# Patient Record
Sex: FEMALE | Race: BLACK OR AFRICAN AMERICAN | NOT HISPANIC OR LATINO
[De-identification: names, ages, dates, MRNs, and addresses within clinical notes are randomized per-mention and may not be internally consistent; named-entity substitution may affect disease eponyms.]

---

## 2019-05-03 PROBLEM — Z00.00 ENCOUNTER FOR PREVENTIVE HEALTH EXAMINATION: Status: ACTIVE | Noted: 2019-05-03

## 2019-05-13 ENCOUNTER — APPOINTMENT (OUTPATIENT)
Dept: OTOLARYNGOLOGY | Facility: CLINIC | Age: 44
End: 2019-05-13
Payer: COMMERCIAL

## 2019-05-13 VITALS
HEART RATE: 98 BPM | BODY MASS INDEX: 31.98 KG/M2 | HEIGHT: 66 IN | DIASTOLIC BLOOD PRESSURE: 88 MMHG | SYSTOLIC BLOOD PRESSURE: 148 MMHG | WEIGHT: 199 LBS

## 2019-05-13 PROCEDURE — 31575 DIAGNOSTIC LARYNGOSCOPY: CPT

## 2019-05-13 PROCEDURE — 99204 OFFICE O/P NEW MOD 45 MIN: CPT | Mod: 25

## 2019-05-14 RX ORDER — METOPROLOL TARTRATE 25 MG/1
25 TABLET, FILM COATED ORAL
Refills: 0 | Status: ACTIVE | COMMUNITY

## 2019-05-14 RX ORDER — AMLODIPINE BESYLATE 5 MG/1
5 TABLET ORAL
Refills: 0 | Status: ACTIVE | COMMUNITY

## 2019-08-16 ENCOUNTER — APPOINTMENT (OUTPATIENT)
Dept: OTOLARYNGOLOGY | Facility: HOSPITAL | Age: 44
End: 2019-08-16

## 2021-07-19 ENCOUNTER — APPOINTMENT (OUTPATIENT)
Dept: OTOLARYNGOLOGY | Facility: CLINIC | Age: 46
End: 2021-07-19
Payer: COMMERCIAL

## 2021-07-19 DIAGNOSIS — D44.0 NEOPLASM OF UNCERTAIN BEHAVIOR OF THYROID GLAND: ICD-10-CM

## 2021-07-19 PROCEDURE — 31575 DIAGNOSTIC LARYNGOSCOPY: CPT

## 2021-07-19 PROCEDURE — 99214 OFFICE O/P EST MOD 30 MIN: CPT | Mod: 25

## 2021-07-20 ENCOUNTER — OUTPATIENT (OUTPATIENT)
Dept: OUTPATIENT SERVICES | Facility: HOSPITAL | Age: 46
LOS: 1 days | End: 2021-07-20

## 2021-07-20 ENCOUNTER — RESULT REVIEW (OUTPATIENT)
Age: 46
End: 2021-07-20

## 2021-07-20 ENCOUNTER — APPOINTMENT (OUTPATIENT)
Dept: CT IMAGING | Facility: CLINIC | Age: 46
End: 2021-07-20
Payer: COMMERCIAL

## 2021-07-20 PROCEDURE — 71260 CT THORAX DX C+: CPT | Mod: 26

## 2021-07-31 ENCOUNTER — LABORATORY RESULT (OUTPATIENT)
Age: 46
End: 2021-07-31

## 2021-08-02 ENCOUNTER — TRANSCRIPTION ENCOUNTER (OUTPATIENT)
Age: 46
End: 2021-08-02

## 2021-08-02 VITALS
OXYGEN SATURATION: 99 % | WEIGHT: 191.58 LBS | SYSTOLIC BLOOD PRESSURE: 131 MMHG | TEMPERATURE: 98 F | HEART RATE: 85 BPM | HEIGHT: 65 IN | RESPIRATION RATE: 16 BRPM | DIASTOLIC BLOOD PRESSURE: 86 MMHG

## 2021-08-03 ENCOUNTER — RESULT REVIEW (OUTPATIENT)
Age: 46
End: 2021-08-03

## 2021-08-03 ENCOUNTER — INPATIENT (INPATIENT)
Facility: HOSPITAL | Age: 46
LOS: 0 days | Discharge: HOME | DRG: 627 | End: 2021-08-04
Attending: SPECIALIST | Admitting: SPECIALIST
Payer: COMMERCIAL

## 2021-08-03 ENCOUNTER — APPOINTMENT (OUTPATIENT)
Dept: OTOLARYNGOLOGY | Facility: HOSPITAL | Age: 46
End: 2021-08-03

## 2021-08-03 DIAGNOSIS — E89.0 POSTPROCEDURAL HYPOTHYROIDISM: Chronic | ICD-10-CM

## 2021-08-03 DIAGNOSIS — Z98.891 HISTORY OF UTERINE SCAR FROM PREVIOUS SURGERY: Chronic | ICD-10-CM

## 2021-08-03 LAB
CALCIUM SERPL-MCNC: 9.3 MG/DL — SIGNIFICANT CHANGE UP (ref 8.4–10.5)
GLUCOSE BLDC GLUCOMTR-MCNC: 118 MG/DL — HIGH (ref 70–99)
GLUCOSE BLDC GLUCOMTR-MCNC: 137 MG/DL — HIGH (ref 70–99)
GLUCOSE BLDC GLUCOMTR-MCNC: 175 MG/DL — HIGH (ref 70–99)
PTH-INTACT IO % DIF SERPL: 49.9 PG/ML — SIGNIFICANT CHANGE UP (ref 15–65)
PTH-INTACT IO % DIF SERPL: 9.3 PG/ML — LOW (ref 15–65)

## 2021-08-03 PROCEDURE — 88307 TISSUE EXAM BY PATHOLOGIST: CPT | Mod: 26

## 2021-08-03 PROCEDURE — 60260 REPEAT THYROID SURGERY: CPT | Mod: LT

## 2021-08-03 RX ORDER — OXYCODONE HYDROCHLORIDE 5 MG/1
5 TABLET ORAL EVERY 6 HOURS
Refills: 0 | Status: DISCONTINUED | OUTPATIENT
Start: 2021-08-03 | End: 2021-08-04

## 2021-08-03 RX ORDER — SODIUM CHLORIDE 9 MG/ML
1000 INJECTION, SOLUTION INTRAVENOUS
Refills: 0 | Status: DISCONTINUED | OUTPATIENT
Start: 2021-08-03 | End: 2021-08-04

## 2021-08-03 RX ORDER — DEXTROSE 50 % IN WATER 50 %
25 SYRINGE (ML) INTRAVENOUS ONCE
Refills: 0 | Status: DISCONTINUED | OUTPATIENT
Start: 2021-08-03 | End: 2021-08-04

## 2021-08-03 RX ORDER — AMLODIPINE BESYLATE 2.5 MG/1
10 TABLET ORAL DAILY
Refills: 0 | Status: DISCONTINUED | OUTPATIENT
Start: 2021-08-04 | End: 2021-08-04

## 2021-08-03 RX ORDER — LABETALOL HCL 100 MG
1 TABLET ORAL
Qty: 0 | Refills: 0 | DISCHARGE

## 2021-08-03 RX ORDER — LANOLIN ALCOHOL/MO/W.PET/CERES
3 CREAM (GRAM) TOPICAL AT BEDTIME
Refills: 0 | Status: DISCONTINUED | OUTPATIENT
Start: 2021-08-03 | End: 2021-08-04

## 2021-08-03 RX ORDER — ACETAMINOPHEN 500 MG
650 TABLET ORAL EVERY 6 HOURS
Refills: 0 | Status: DISCONTINUED | OUTPATIENT
Start: 2021-08-03 | End: 2021-08-04

## 2021-08-03 RX ORDER — DEXTROSE 50 % IN WATER 50 %
12.5 SYRINGE (ML) INTRAVENOUS ONCE
Refills: 0 | Status: DISCONTINUED | OUTPATIENT
Start: 2021-08-03 | End: 2021-08-04

## 2021-08-03 RX ORDER — BENZOCAINE AND MENTHOL 5; 1 G/100ML; G/100ML
1 LIQUID ORAL
Refills: 0 | Status: DISCONTINUED | OUTPATIENT
Start: 2021-08-03 | End: 2021-08-04

## 2021-08-03 RX ORDER — GLUCAGON INJECTION, SOLUTION 0.5 MG/.1ML
1 INJECTION, SOLUTION SUBCUTANEOUS ONCE
Refills: 0 | Status: DISCONTINUED | OUTPATIENT
Start: 2021-08-03 | End: 2021-08-04

## 2021-08-03 RX ORDER — ALBUTEROL 90 UG/1
2 AEROSOL, METERED ORAL EVERY 6 HOURS
Refills: 0 | Status: DISCONTINUED | OUTPATIENT
Start: 2021-08-03 | End: 2021-08-04

## 2021-08-03 RX ORDER — MORPHINE SULFATE 50 MG/1
2 CAPSULE, EXTENDED RELEASE ORAL ONCE
Refills: 0 | Status: DISCONTINUED | OUTPATIENT
Start: 2021-08-03 | End: 2021-08-03

## 2021-08-03 RX ORDER — BENZOCAINE AND MENTHOL 5; 1 G/100ML; G/100ML
1 LIQUID ORAL
Refills: 0 | Status: DISCONTINUED | OUTPATIENT
Start: 2021-08-03 | End: 2021-08-03

## 2021-08-03 RX ORDER — OXYCODONE HYDROCHLORIDE 5 MG/1
10 TABLET ORAL EVERY 6 HOURS
Refills: 0 | Status: DISCONTINUED | OUTPATIENT
Start: 2021-08-03 | End: 2021-08-04

## 2021-08-03 RX ORDER — LEVOTHYROXINE SODIUM 125 MCG
150 TABLET ORAL EVERY 24 HOURS
Refills: 0 | Status: DISCONTINUED | OUTPATIENT
Start: 2021-08-04 | End: 2021-08-04

## 2021-08-03 RX ORDER — ONDANSETRON 8 MG/1
4 TABLET, FILM COATED ORAL EVERY 6 HOURS
Refills: 0 | Status: DISCONTINUED | OUTPATIENT
Start: 2021-08-03 | End: 2021-08-04

## 2021-08-03 RX ORDER — METFORMIN HYDROCHLORIDE 850 MG/1
1 TABLET ORAL
Qty: 0 | Refills: 0 | DISCHARGE

## 2021-08-03 RX ORDER — INSULIN LISPRO 100/ML
VIAL (ML) SUBCUTANEOUS
Refills: 0 | Status: DISCONTINUED | OUTPATIENT
Start: 2021-08-03 | End: 2021-08-04

## 2021-08-03 RX ORDER — SENNA PLUS 8.6 MG/1
2 TABLET ORAL AT BEDTIME
Refills: 0 | Status: DISCONTINUED | OUTPATIENT
Start: 2021-08-03 | End: 2021-08-04

## 2021-08-03 RX ORDER — DEXTROSE 50 % IN WATER 50 %
15 SYRINGE (ML) INTRAVENOUS ONCE
Refills: 0 | Status: DISCONTINUED | OUTPATIENT
Start: 2021-08-03 | End: 2021-08-04

## 2021-08-03 RX ADMIN — MORPHINE SULFATE 2 MILLIGRAM(S): 50 CAPSULE, EXTENDED RELEASE ORAL at 15:48

## 2021-08-03 RX ADMIN — Medication 650 MILLIGRAM(S): at 19:03

## 2021-08-03 RX ADMIN — Medication 1: at 22:51

## 2021-08-03 RX ADMIN — Medication 650 MILLIGRAM(S): at 19:52

## 2021-08-03 RX ADMIN — MORPHINE SULFATE 2 MILLIGRAM(S): 50 CAPSULE, EXTENDED RELEASE ORAL at 17:17

## 2021-08-03 NOTE — H&P ADULT - NSHPPHYSICALEXAM_GEN_ALL_CORE
Gen: NAD   CN II-XII grossly intact b/l   AAOx3   OC/OP: clear   Neck: soft, flat, appropriately tender. Steri-strips in place, c/d/i. Right neck ALEKSANDER w/ dark colored drainage 2/2 surgice packing.   Resp: breathing comfortably on RA, no stridor, voice strong

## 2021-08-03 NOTE — H&P ADULT - HISTORY OF PRESENT ILLNESS
45 yo F, [employer], hx of HTN, ?DM, right thyroid lobectomy for goiter in 2006, now s/p completion total thyroidectomy for compressive goiter. Preop PTH 49.9. No introp complications.

## 2021-08-03 NOTE — PACU DISCHARGE NOTE - COMMENTS
pt met criteria for discharge- received pt from DELANO Alvarez- via stretcher with HOB @ 30% -tolerated some ice chips w. o N/V/D- able to articulate her words and no S&S of hoarseness- denies any numbness and tingling around her lips and fingers- expresses some Pain relief w/o Morphine IV- hemodynamically stable- MD Trimble (ENT) at bedside and inform ENT of intra -op PTH=9.3- No intervention at present- continue to monitor -endorsed to DELANO Villaseñor from 5 madhav-pt left unit via stretcher to 541-pt left unit via stretcher

## 2021-08-03 NOTE — H&P ADULT - ASSESSMENT
47 yo F s/p completion thyroidectomy   - Admit to regional floor under ENT (Dr. Lopez)   - PTH @ 0635   - Ca @ 1900   - Restart home BP meds   - Synthroid dose as prescribed by Endocrinologist   - ALEKSANDER drain   - Pain control   - DM diet, ISS   - Ambulate, ICDs   - Albuterol PRN   - Please page ENT w/ any additional questions/concerns

## 2021-08-04 ENCOUNTER — TRANSCRIPTION ENCOUNTER (OUTPATIENT)
Age: 46
End: 2021-08-04

## 2021-08-04 VITALS
RESPIRATION RATE: 18 BRPM | HEART RATE: 84 BPM | TEMPERATURE: 99 F | DIASTOLIC BLOOD PRESSURE: 82 MMHG | SYSTOLIC BLOOD PRESSURE: 121 MMHG | OXYGEN SATURATION: 98 %

## 2021-08-04 LAB
A1C WITH ESTIMATED AVERAGE GLUCOSE RESULT: 5.4 % — SIGNIFICANT CHANGE UP (ref 4–5.6)
CALCIUM SERPL-MCNC: 8.5 MG/DL — SIGNIFICANT CHANGE UP (ref 8.4–10.5)
CALCIUM SERPL-MCNC: 8.6 MG/DL — SIGNIFICANT CHANGE UP (ref 8.4–10.5)
CALCIUM SERPL-MCNC: 8.9 MG/DL — SIGNIFICANT CHANGE UP (ref 8.4–10.5)
COVID-19 SPIKE DOMAIN AB INTERP: POSITIVE
COVID-19 SPIKE DOMAIN ANTIBODY RESULT: >250 U/ML — HIGH
ESTIMATED AVERAGE GLUCOSE: 108 MG/DL — SIGNIFICANT CHANGE UP (ref 68–114)
GLUCOSE BLDC GLUCOMTR-MCNC: 101 MG/DL — HIGH (ref 70–99)
GLUCOSE BLDC GLUCOMTR-MCNC: 105 MG/DL — HIGH (ref 70–99)
GLUCOSE BLDC GLUCOMTR-MCNC: 116 MG/DL — HIGH (ref 70–99)
SARS-COV-2 IGG+IGM SERPL QL IA: >250 U/ML — HIGH
SARS-COV-2 IGG+IGM SERPL QL IA: POSITIVE

## 2021-08-04 PROCEDURE — 82310 ASSAY OF CALCIUM: CPT

## 2021-08-04 PROCEDURE — 83036 HEMOGLOBIN GLYCOSYLATED A1C: CPT

## 2021-08-04 PROCEDURE — 83970 ASSAY OF PARATHORMONE: CPT

## 2021-08-04 PROCEDURE — 82962 GLUCOSE BLOOD TEST: CPT

## 2021-08-04 PROCEDURE — 86769 SARS-COV-2 COVID-19 ANTIBODY: CPT

## 2021-08-04 PROCEDURE — 88307 TISSUE EXAM BY PATHOLOGIST: CPT

## 2021-08-04 PROCEDURE — 36415 COLL VENOUS BLD VENIPUNCTURE: CPT

## 2021-08-04 RX ORDER — LEVOTHYROXINE SODIUM 125 MCG
1 TABLET ORAL
Qty: 0 | Refills: 0 | DISCHARGE
Start: 2021-08-04

## 2021-08-04 RX ADMIN — OXYCODONE HYDROCHLORIDE 5 MILLIGRAM(S): 5 TABLET ORAL at 17:10

## 2021-08-04 RX ADMIN — Medication 150 MICROGRAM(S): at 06:06

## 2021-08-04 RX ADMIN — OXYCODONE HYDROCHLORIDE 5 MILLIGRAM(S): 5 TABLET ORAL at 05:48

## 2021-08-04 RX ADMIN — Medication 650 MILLIGRAM(S): at 01:13

## 2021-08-04 RX ADMIN — OXYCODONE HYDROCHLORIDE 5 MILLIGRAM(S): 5 TABLET ORAL at 16:12

## 2021-08-04 RX ADMIN — Medication 650 MILLIGRAM(S): at 02:30

## 2021-08-04 RX ADMIN — OXYCODONE HYDROCHLORIDE 5 MILLIGRAM(S): 5 TABLET ORAL at 06:50

## 2021-08-04 RX ADMIN — Medication 650 MILLIGRAM(S): at 12:15

## 2021-08-04 RX ADMIN — Medication 650 MILLIGRAM(S): at 11:15

## 2021-08-04 NOTE — DISCHARGE NOTE PROVIDER - NSDCMRMEDTOKEN_GEN_ALL_CORE_FT
labetalol 200 mg oral tablet: 1 tab(s) orally 2 times a day  levothyroxine 150 mcg (0.15 mg) oral tablet: 1 tab(s) orally every 24 hours  metFORMIN 1000 mg oral tablet: 1 tab(s) orally 2 times a day

## 2021-08-04 NOTE — DISCHARGE NOTE PROVIDER - NSDCCPGOAL_GEN_ALL_CORE_FT
Goal Outcome Evaluation:  Plan of Care Reviewed With: patient        Progress: no change  Outcome Summary: Patient has slept through the night, no new complaints at this time. shiley and f/c removed yesterday, pt voiding on her own. pt reminded to turn throughout the night.   To get better and follow your care plan as instructed.

## 2021-08-04 NOTE — DISCHARGE NOTE PROVIDER - NSDCCPCAREPLAN_GEN_ALL_CORE_FT
PRINCIPAL DISCHARGE DIAGNOSIS  Diagnosis: S/P thyroidectomy  Assessment and Plan of Treatment:

## 2021-08-04 NOTE — DISCHARGE NOTE PROVIDER - HOSPITAL COURSE
HPI:  47 yo F, [place], hx of HTN, ?DM, right thyroid lobectomy for goiter in 2006, now s/p completion total thyroidectomy for compressive goiter. Preop PTH 49.9. No introp complications.      Interval History:  8/4- Patient seen and examined at bedside. NATHAN. Patient reports pain is controlled and is tolerating diet. She denies any facial paresthesias or hoarseness. Overnight, her ALEKSANDER drain output was 90cc, but this rapidly decreased over the course of the day. Her calcium was 9.3 in the immediate post-operative period and gradually downtrended until stabilizing at 8.5-8.6. Her ALEKSANDER drain was removed in the evening and she denied any oral tingling or numbness, finger tingling/numbness and muscle spasms. Her chvostek sign was negative. She was discussed to make note of any tingling/numbness of mouth or fingers as well as muscles spasms as this could indicate a low calcium level and asked to take 3 tums and call Dr. Lopez if this were to occur. She was medically cleared for discharge and instructed to take tylenol/motrin as needed for pain, pat her neck dry when showering, and follow up with Dr. Lopez outpatient for suture removal.

## 2021-08-04 NOTE — DISCHARGE NOTE PROVIDER - CARE PROVIDER_API CALL
Gloria Ye)  [facility]; Otolaryngology  [address] [place] [address], [address] [place] [address]  Phone: [phone]  Fax: [phone]  Established Patient  Follow Up Time:

## 2021-08-04 NOTE — DISCHARGE NOTE PROVIDER - NSDCFUADDINST_GEN_ALL_CORE_FT
Tylenol and motrin as needed every 4 hours for pain  Keep the dressings in place  Pat neck dry after showering  Monitor for any numbness/tingling of mouth or fingers and spasms of muscles  - If this occurs, please take 3 Tums and call Dr. Lopez    Call Dr. Lopez's office to schedule a follow up appointment at [phone]  Please schedule a follow-up appointment with your endocrinologist    Return to the ED if you experience any difficulty breathing, inability swallowing, uncontrolled pain, opening of wound, significant swelling of wound

## 2021-08-04 NOTE — PROGRESS NOTE ADULT - SUBJECTIVE AND OBJECTIVE BOX
ENT Progress Note    HPI:  47 yo F, [place], hx of HTN, ?DM, right thyroid lobectomy for goiter in 2006, now s/p completion total thyroidectomy for compressive goiter. Preop PTH 49.9. No introp complications.      Interval History:  8/4- Patient seen and examined at bedside. NATHAN. Patient reports pain is controlled and is tolerating diet. She denies any facial paresthesias or hoarseness.    Allergies    No Known Allergies    Intolerances    MEDICATIONS  (STANDING):  amLODIPine   Tablet 10 milliGRAM(s) Oral daily  dextrose 40% Gel 15 Gram(s) Oral once  dextrose 5%. 1000 milliLiter(s) (50 mL/Hr) IV Continuous <Continuous>  dextrose 5%. 1000 milliLiter(s) (100 mL/Hr) IV Continuous <Continuous>  dextrose 50% Injectable 25 Gram(s) IV Push once  dextrose 50% Injectable 12.5 Gram(s) IV Push once  dextrose 50% Injectable 25 Gram(s) IV Push once  glucagon  Injectable 1 milliGRAM(s) IntraMuscular once  insulin lispro (ADMELOG) corrective regimen sliding scale   SubCutaneous Before meals and at bedtime  lactated ringers. 1000 milliLiter(s) (75 mL/Hr) IV Continuous <Continuous>  levothyroxine 150 MICROGram(s) Oral every 24 hours    MEDICATIONS  (PRN):  acetaminophen   Tablet .. 650 milliGRAM(s) Oral every 6 hours PRN Temp greater or equal to 38C (100.4F), Mild Pain (1 - 3)  ALBUTerol    90 MICROgram(s) HFA Inhaler 2 Puff(s) Inhalation every 6 hours PRN Shortness of Breath and/or Wheezing  benzocaine 15 mG/menthol 3.6 mG Lozenge 1 Lozenge Oral every 2 hours PRN Sore Throat  melatonin 3 milliGRAM(s) Oral at bedtime PRN Insomnia  ondansetron Injectable 4 milliGRAM(s) IV Push every 6 hours PRN Nausea and/or Vomiting  oxyCODONE    IR 5 milliGRAM(s) Oral every 6 hours PRN Moderate Pain (4 - 6)  oxyCODONE    IR 10 milliGRAM(s) Oral every 6 hours PRN Severe Pain (7 - 10)  senna 2 Tablet(s) Oral at bedtime PRN Constipation      Vital Signs Last 24 Hrs  T(C): 36.9 (04 Aug 2021 05:45), Max: 37.2 (03 Aug 2021 20:45)  T(F): 98.4 (04 Aug 2021 05:45), Max: 99 (03 Aug 2021 20:45)  HR: 88 (04 Aug 2021 05:45) (83 - 107)  BP: 130/82 (04 Aug 2021 05:45) (121/81 - 145/81)  BP(mean): 101 (03 Aug 2021 17:45) (94 - 108)  ABP: 160/88 (03 Aug 2021 17:30) (156/81 - 172/92)  ABP(mean): 113 (03 Aug 2021 17:30) (106 - 120)  RR: 18 (04 Aug 2021 05:45) (18 - 23)  SpO2: 98% (04 Aug 2021 05:45) (96% - 100%)      Physical Exam:  General- A&Ox3, nAD  Head- normocephalic, atraumatic  ENT- MMM, neck incision c/d/i, neck ALEKSANDER in place with serosanguinous output  Cardiac- regular rate  Pulm- normal respiratory effort        LABS:  cret      Ca    8.9      04 Aug 2021 06:53      I&O's Detail    03 Aug 2021 07:01  -  04 Aug 2021 07:00  --------------------------------------------------------  IN:    Lactated Ringers: 300 mL  Total IN: 300 mL    OUT:    Bulb (mL): 105 mL    Voided (mL): 1550 mL  Total OUT: 1655 mL    Total NET: -1355 mL      04 Aug 2021 07:01  -  04 Aug 2021 09:06  --------------------------------------------------------  IN:    Oral Fluid: 240 mL  Total IN: 240 mL    OUT:    Bulb (mL): 30 mL    Voided (mL): 600 mL  Total OUT: 630 mL    Total NET: -390 mL

## 2021-08-04 NOTE — DISCHARGE NOTE NURSING/CASE MANAGEMENT/SOCIAL WORK - PATIENT PORTAL LINK FT
You can access the FollowMyHealth Patient Portal offered by [facility] by registering at the following website: [email or web address]. By joining [facility]’s FollowMyHealth portal, you will also be able to view your health information using other applications (apps) compatible with our system.

## 2021-08-04 NOTE — PROGRESS NOTE ADULT - ASSESSMENT
45 yo F, [employer], hx of HTN, ?DM, right thyroid lobectomy for goiter in 2006, now s/p completion total thyroidectomy for compressive goiter.    Plan:  - f/u PM calcium  - keep ALEKSANDER and monitor output  - regular diet  - pain regimen  - OOB  - if PM calcium stable and patient has strong desire to go home, will consider discharge with ALEKSANDER drain    Discussed with Dr. Lopez.

## 2021-08-05 PROBLEM — I10 ESSENTIAL (PRIMARY) HYPERTENSION: Chronic | Status: ACTIVE | Noted: 2021-08-02

## 2021-08-05 PROBLEM — E11.9 TYPE 2 DIABETES MELLITUS WITHOUT COMPLICATIONS: Chronic | Status: ACTIVE | Noted: 2021-08-02

## 2021-08-05 PROBLEM — O24.419 GESTATIONAL DIABETES MELLITUS IN PREGNANCY, UNSPECIFIED CONTROL: Chronic | Status: ACTIVE | Noted: 2021-08-02

## 2021-08-06 LAB — SURGICAL PATHOLOGY STUDY: SIGNIFICANT CHANGE UP

## 2021-08-10 ENCOUNTER — APPOINTMENT (OUTPATIENT)
Dept: OTOLARYNGOLOGY | Facility: CLINIC | Age: 46
End: 2021-08-10
Payer: COMMERCIAL

## 2021-08-10 DIAGNOSIS — Z79.84 LONG TERM (CURRENT) USE OF ORAL HYPOGLYCEMIC DRUGS: ICD-10-CM

## 2021-08-10 DIAGNOSIS — R13.10 DYSPHAGIA, UNSPECIFIED: ICD-10-CM

## 2021-08-10 DIAGNOSIS — E01.0 IODINE-DEFICIENCY RELATED DIFFUSE (ENDEMIC) GOITER: ICD-10-CM

## 2021-08-10 DIAGNOSIS — E89.0 POSTPROCEDURAL HYPOTHYROIDISM: ICD-10-CM

## 2021-08-10 DIAGNOSIS — I10 ESSENTIAL (PRIMARY) HYPERTENSION: ICD-10-CM

## 2021-08-10 DIAGNOSIS — J39.8 OTHER SPECIFIED DISEASES OF UPPER RESPIRATORY TRACT: ICD-10-CM

## 2021-08-10 DIAGNOSIS — E11.9 TYPE 2 DIABETES MELLITUS WITHOUT COMPLICATIONS: ICD-10-CM

## 2021-08-10 PROCEDURE — 31575 DIAGNOSTIC LARYNGOSCOPY: CPT | Mod: 79

## 2022-01-24 ENCOUNTER — APPOINTMENT (OUTPATIENT)
Dept: OTOLARYNGOLOGY | Facility: CLINIC | Age: 47
End: 2022-01-24
Payer: COMMERCIAL

## 2022-01-24 DIAGNOSIS — J38.6 STENOSIS OF LARYNX: ICD-10-CM

## 2022-01-24 DIAGNOSIS — E89.0 POSTPROCEDURAL HYPOTHYROIDISM: ICD-10-CM

## 2022-01-24 DIAGNOSIS — J39.8 OTHER SPECIFIED DISEASES OF UPPER RESPIRATORY TRACT: ICD-10-CM

## 2022-01-24 PROCEDURE — 31575 DIAGNOSTIC LARYNGOSCOPY: CPT

## 2022-01-24 PROCEDURE — 99214 OFFICE O/P EST MOD 30 MIN: CPT | Mod: 25

## 2023-04-07 NOTE — H&P ADULT - DOES THIS PATIENT HAVE A HISTORY OF OR HAS BEEN DX WITH HEART FAILURE?
Received a request from [employer] for prior authorization for Nuvigil  Spoke to the pharmacist advised prior authorization on file is good until 7/3/2023  Per the pharmacist they may not have updated insurance information       Left message for Gosia Guerrier to call [facility] to give them updated information
unknown
